# Patient Record
Sex: FEMALE | Race: WHITE | NOT HISPANIC OR LATINO | URBAN - METROPOLITAN AREA
[De-identification: names, ages, dates, MRNs, and addresses within clinical notes are randomized per-mention and may not be internally consistent; named-entity substitution may affect disease eponyms.]

---

## 2022-07-07 ENCOUNTER — HOSPITAL ENCOUNTER (OUTPATIENT)
Dept: RADIOLOGY | Facility: MEDICAL CENTER | Age: 57
End: 2022-07-07
Attending: INTERNAL MEDICINE
Payer: COMMERCIAL

## 2022-07-07 DIAGNOSIS — R07.9 CHEST PAIN, UNSPECIFIED TYPE: ICD-10-CM

## 2022-07-07 DIAGNOSIS — J06.9 ACUTE UPPER RESPIRATORY INFECTION: ICD-10-CM

## 2022-07-07 DIAGNOSIS — J06.9 VIRAL UPPER RESPIRATORY INFECTION: ICD-10-CM

## 2022-07-07 DIAGNOSIS — R06.02 SHORTNESS OF BREATH: ICD-10-CM

## 2022-07-07 PROCEDURE — 4410556 CT-CARDIAC SCORING (SELF PAY ONLY)

## 2022-07-07 PROCEDURE — 71250 CT THORAX DX C-: CPT

## 2022-10-19 ENCOUNTER — APPOINTMENT (RX ONLY)
Dept: URBAN - METROPOLITAN AREA CLINIC 6 | Facility: CLINIC | Age: 57
Setting detail: DERMATOLOGY
End: 2022-10-19

## 2022-10-19 DIAGNOSIS — L57.0 ACTINIC KERATOSIS: ICD-10-CM

## 2022-10-19 DIAGNOSIS — L57.8 OTHER SKIN CHANGES DUE TO CHRONIC EXPOSURE TO NONIONIZING RADIATION: ICD-10-CM

## 2022-10-19 DIAGNOSIS — L81.4 OTHER MELANIN HYPERPIGMENTATION: ICD-10-CM

## 2022-10-19 DIAGNOSIS — D22 MELANOCYTIC NEVI: ICD-10-CM

## 2022-10-19 DIAGNOSIS — L82.1 OTHER SEBORRHEIC KERATOSIS: ICD-10-CM

## 2022-10-19 DIAGNOSIS — D18.0 HEMANGIOMA: ICD-10-CM

## 2022-10-19 DIAGNOSIS — D485 NEOPLASM OF UNCERTAIN BEHAVIOR OF SKIN: ICD-10-CM

## 2022-10-19 DIAGNOSIS — Z71.89 OTHER SPECIFIED COUNSELING: ICD-10-CM

## 2022-10-19 PROBLEM — D48.5 NEOPLASM OF UNCERTAIN BEHAVIOR OF SKIN: Status: ACTIVE | Noted: 2022-10-19

## 2022-10-19 PROBLEM — D23.71 OTHER BENIGN NEOPLASM OF SKIN OF RIGHT LOWER LIMB, INCLUDING HIP: Status: ACTIVE | Noted: 2022-10-19

## 2022-10-19 PROBLEM — D22.5 MELANOCYTIC NEVI OF TRUNK: Status: ACTIVE | Noted: 2022-10-19

## 2022-10-19 PROBLEM — D18.01 HEMANGIOMA OF SKIN AND SUBCUTANEOUS TISSUE: Status: ACTIVE | Noted: 2022-10-19

## 2022-10-19 PROCEDURE — ? ADDITIONAL NOTES

## 2022-10-19 PROCEDURE — ? COUNSELING

## 2022-10-19 PROCEDURE — 99203 OFFICE O/P NEW LOW 30 MIN: CPT

## 2022-10-19 ASSESSMENT — LOCATION SIMPLE DESCRIPTION DERM
LOCATION SIMPLE: RIGHT UPPER ARM
LOCATION SIMPLE: LOWER BACK
LOCATION SIMPLE: RIGHT POSTERIOR UPPER ARM
LOCATION SIMPLE: LEFT UPPER ARM
LOCATION SIMPLE: RIGHT ELBOW
LOCATION SIMPLE: LEFT UPPER BACK
LOCATION SIMPLE: RIGHT FOREARM
LOCATION SIMPLE: RIGHT CHEEK
LOCATION SIMPLE: RIGHT UPPER BACK
LOCATION SIMPLE: LEFT POSTERIOR UPPER ARM
LOCATION SIMPLE: LEFT FOREARM

## 2022-10-19 ASSESSMENT — LOCATION ZONE DERM
LOCATION ZONE: TRUNK
LOCATION ZONE: FACE
LOCATION ZONE: ARM

## 2022-10-19 ASSESSMENT — LOCATION DETAILED DESCRIPTION DERM
LOCATION DETAILED: LEFT DISTAL DORSAL FOREARM
LOCATION DETAILED: LEFT INFERIOR UPPER BACK
LOCATION DETAILED: RIGHT INFERIOR CENTRAL BUCCAL CHEEK
LOCATION DETAILED: RIGHT SUPERIOR MEDIAL UPPER BACK
LOCATION DETAILED: RIGHT ELBOW
LOCATION DETAILED: RIGHT SUPERIOR UPPER BACK
LOCATION DETAILED: LEFT DISTAL MEDIAL POSTERIOR UPPER ARM
LOCATION DETAILED: SUPERIOR LUMBAR SPINE
LOCATION DETAILED: LEFT VENTRAL LATERAL DISTAL FOREARM
LOCATION DETAILED: LEFT ANTERIOR PROXIMAL UPPER ARM
LOCATION DETAILED: RIGHT VENTRAL MEDIAL DISTAL FOREARM
LOCATION DETAILED: RIGHT DISTAL RADIAL DORSAL FOREARM
LOCATION DETAILED: RIGHT DISTAL LATERAL POSTERIOR UPPER ARM
LOCATION DETAILED: RIGHT ANTERIOR PROXIMAL UPPER ARM

## 2022-10-19 ASSESSMENT — PAIN INTENSITY VAS: HOW INTENSE IS YOUR PAIN 0 BEING NO PAIN, 10 BEING THE MOST SEVERE PAIN POSSIBLE?: NO PAIN

## 2022-10-19 NOTE — PROCEDURE: ADDITIONAL NOTES
Additional Notes: Denied LN2 treatment today. Pt would like to try a more natural way at home.
Render Risk Assessment In Note?: no
Detail Level: Simple
Additional Notes: Discussed doing an excision, pt wants to hold off to deal with some health issues.

## 2022-11-11 ENCOUNTER — APPOINTMENT (OUTPATIENT)
Dept: URGENT CARE | Facility: CLINIC | Age: 57
End: 2022-11-11
Payer: COMMERCIAL

## 2022-11-12 ENCOUNTER — OFFICE VISIT (OUTPATIENT)
Dept: URGENT CARE | Facility: CLINIC | Age: 57
End: 2022-11-12
Payer: COMMERCIAL

## 2022-11-12 VITALS
SYSTOLIC BLOOD PRESSURE: 94 MMHG | OXYGEN SATURATION: 96 % | HEART RATE: 78 BPM | WEIGHT: 129 LBS | DIASTOLIC BLOOD PRESSURE: 58 MMHG | HEIGHT: 65 IN | RESPIRATION RATE: 12 BRPM | TEMPERATURE: 98.4 F | BODY MASS INDEX: 21.49 KG/M2

## 2022-11-12 DIAGNOSIS — M26.652 ARTHROPATHY OF LEFT TEMPOROMANDIBULAR JOINT: ICD-10-CM

## 2022-11-12 PROBLEM — E55.9 VITAMIN D DEFICIENCY: Status: ACTIVE | Noted: 2021-05-10

## 2022-11-12 PROBLEM — D75.89 MACROCYTOSIS WITHOUT ANEMIA: Status: ACTIVE | Noted: 2019-12-22

## 2022-11-12 PROBLEM — D24.9 FIBROADENOMA OF BREAST: Status: ACTIVE | Noted: 2017-11-02

## 2022-11-12 PROBLEM — N95.1 MENOPAUSAL SYMPTOMS: Status: ACTIVE | Noted: 2017-11-02

## 2022-11-12 PROCEDURE — 99213 OFFICE O/P EST LOW 20 MIN: CPT | Performed by: PHYSICIAN ASSISTANT

## 2022-11-12 RX ORDER — CHLORAL HYDRATE 500 MG
CAPSULE ORAL
COMMUNITY

## 2022-11-12 RX ORDER — MILK THISTLE 100 %
POWDER (GRAM) MISCELLANEOUS
COMMUNITY

## 2022-11-12 RX ORDER — LEVOTHYROXINE SODIUM 50 UG/1
25 TABLET ORAL
COMMUNITY
Start: 2022-09-28 | End: 2023-03-20 | Stop reason: SDUPTHER

## 2022-11-12 ASSESSMENT — ENCOUNTER SYMPTOMS
DIZZINESS: 0
EYE PAIN: 0
CHILLS: 0
DIAPHORESIS: 0
ABDOMINAL PAIN: 0
EYE DISCHARGE: 0
WHEEZING: 0
SINUS PAIN: 0
CONSTIPATION: 0
FEVER: 0
DIARRHEA: 0
SORE THROAT: 0
VOMITING: 0
SHORTNESS OF BREATH: 0
COUGH: 0
EYE REDNESS: 0
NAUSEA: 0
HEADACHES: 0

## 2022-11-12 NOTE — PROGRESS NOTES
"  Subjective:     Paola Everett  is a 56 y.o. female who presents for Otalgia (/jaw pain, LEFT side, x10 days  notes history of TMJ, )       She presents today with left-sided TMJ pain, TMJ mechanical symptoms, and otalgia x10 days.  Does have a longstanding history of TMJ and does use a at night bite block for this.  She has seen her chiropractor for the symptoms, he has performed adjustments but did want the patient to follow-up to rule out an infection.  She has some discomfort with chewing but denies jaw claudication.  No temporal pain or temporal tenderness.  No changes in vision.  No jaw malocclusion or dental pain.  No trauma or injury to the jaw.  Has been taking over-the-counter medications for symptoms.     Review of Systems   Constitutional:  Negative for chills, diaphoresis, fever and malaise/fatigue.   HENT:  Positive for ear pain. Negative for congestion, ear discharge, hearing loss, sinus pain and sore throat.         Left-sided TMJ pain   Eyes:  Negative for pain, discharge and redness.   Respiratory:  Negative for cough, shortness of breath and wheezing.    Cardiovascular:  Negative for chest pain.   Gastrointestinal:  Negative for abdominal pain, constipation, diarrhea, nausea and vomiting.   Neurological:  Negative for dizziness and headaches.    Allergies   Allergen Reactions    Gluten Meal Vomiting    Sulfa Drugs Hives    Codeine Itching     Itching      Sulfamethoxazole-Trimethoprim Rash    Oxycodone-Acetaminophen Itching     History reviewed. No pertinent past medical history.     Objective:   BP (!) 94/58   Pulse 78   Temp 36.9 °C (98.4 °F) (Temporal)   Resp 12   Ht 1.651 m (5' 5\")   Wt 58.5 kg (129 lb)   SpO2 96%   BMI 21.47 kg/m²   Physical Exam  Vitals and nursing note reviewed.   Constitutional:       General: She is not in acute distress.     Appearance: She is not ill-appearing or toxic-appearing.   HENT:      Head: Normocephalic.      Comments: No tenderness to palpation over " the left-sided temporal region or over the temporal arteries.     Right Ear: Tympanic membrane, ear canal and external ear normal. There is no impacted cerumen.      Left Ear: Tympanic membrane, ear canal and external ear normal. There is no impacted cerumen.      Nose: No congestion or rhinorrhea.      Mouth/Throat:      Mouth: Mucous membranes are moist.      Pharynx: No oropharyngeal exudate or posterior oropharyngeal erythema.      Comments: Tenderness to palpation over the left-sided TMJ region, symptoms are worsened with biting down.  No clicking or grinding noted on exam.  There is tenderness to palpation over the masseter muscle belly and the temporalis muscle belly, palpating these muscle bellies does somewhat reproduce her jaw symptoms.  Eyes:      General: No scleral icterus.     Conjunctiva/sclera: Conjunctivae normal.   Pulmonary:      Effort: Pulmonary effort is normal. No respiratory distress.      Breath sounds: No stridor.   Musculoskeletal:      Cervical back: Neck supple.   Neurological:      Mental Status: She is alert and oriented to person, place, and time.   Psychiatric:         Mood and Affect: Mood normal.         Behavior: Behavior normal.         Thought Content: Thought content normal.         Judgment: Judgment normal.           Diagnostic testing: None    Assessment/Plan:     Encounter Diagnoses   Name Primary?    Arthropathy of left temporomandibular joint           Plan for care for today's complaint includes continuation of over-the-counter anti-inflammatory medications for symptoms.  Continue to follow with chiropractic care for ongoing evaluation and management of the TMJ.  Did discuss with the patient that there is no evidence of ear infection on exam today.  She is tender to palpation over the masseter and temporalis muscle bellies, this tenderness does reproduce her TMJ symptoms, so I did instruct her on self myofascial release techniques of these muscles.  She should trial  these muscle release techniques 3-4 times per week.  If symptoms or not improved with medications and myofascial release techniques then referral would be warranted to ENT for further evaluation management.    See AVS Instructions below for written guidance provided to patient on after-visit management and care in addition to our verbal discussion during the visit.    Please note that this dictation was created using voice recognition software. I have attempted to correct all errors, but there may be sound-alike, spelling, grammar and possibly content errors that I did not discover before finalizing the note.    Danteimelda Simpson PA-C

## 2023-02-27 ENCOUNTER — APPOINTMENT (RX ONLY)
Dept: URBAN - METROPOLITAN AREA CLINIC 4 | Facility: CLINIC | Age: 58
Setting detail: DERMATOLOGY
End: 2023-02-27

## 2023-02-27 DIAGNOSIS — L21.8 OTHER SEBORRHEIC DERMATITIS: ICD-10-CM

## 2023-02-27 DIAGNOSIS — F42.4 EXCORIATION (SKIN-PICKING) DISORDER: ICD-10-CM

## 2023-02-27 DIAGNOSIS — D18.0 HEMANGIOMA: ICD-10-CM

## 2023-02-27 DIAGNOSIS — D22 MELANOCYTIC NEVI: ICD-10-CM

## 2023-02-27 DIAGNOSIS — Z71.89 OTHER SPECIFIED COUNSELING: ICD-10-CM

## 2023-02-27 DIAGNOSIS — L72.8 OTHER FOLLICULAR CYSTS OF THE SKIN AND SUBCUTANEOUS TISSUE: ICD-10-CM

## 2023-02-27 DIAGNOSIS — L81.4 OTHER MELANIN HYPERPIGMENTATION: ICD-10-CM

## 2023-02-27 DIAGNOSIS — L82.1 OTHER SEBORRHEIC KERATOSIS: ICD-10-CM

## 2023-02-27 DIAGNOSIS — L85.3 XEROSIS CUTIS: ICD-10-CM

## 2023-02-27 DIAGNOSIS — L57.8 OTHER SKIN CHANGES DUE TO CHRONIC EXPOSURE TO NONIONIZING RADIATION: ICD-10-CM

## 2023-02-27 PROBLEM — D22.5 MELANOCYTIC NEVI OF TRUNK: Status: ACTIVE | Noted: 2023-02-27

## 2023-02-27 PROBLEM — D48.5 NEOPLASM OF UNCERTAIN BEHAVIOR OF SKIN: Status: ACTIVE | Noted: 2023-02-27

## 2023-02-27 PROBLEM — L30.9 DERMATITIS, UNSPECIFIED: Status: ACTIVE | Noted: 2023-02-27

## 2023-02-27 PROBLEM — D18.01 HEMANGIOMA OF SKIN AND SUBCUTANEOUS TISSUE: Status: ACTIVE | Noted: 2023-02-27

## 2023-02-27 PROBLEM — D23.72 OTHER BENIGN NEOPLASM OF SKIN OF LEFT LOWER LIMB, INCLUDING HIP: Status: ACTIVE | Noted: 2023-02-27

## 2023-02-27 PROCEDURE — ? OBSERVATION

## 2023-02-27 PROCEDURE — ? ADDITIONAL NOTES

## 2023-02-27 PROCEDURE — ? COUNSELING

## 2023-02-27 PROCEDURE — 99213 OFFICE O/P EST LOW 20 MIN: CPT

## 2023-02-27 ASSESSMENT — LOCATION DETAILED DESCRIPTION DERM
LOCATION DETAILED: LEFT MEDIAL SUPERIOR CHEST
LOCATION DETAILED: UPPER STERNUM
LOCATION DETAILED: RIGHT INFERIOR UPPER BACK
LOCATION DETAILED: RIGHT MID-UPPER BACK
LOCATION DETAILED: MIDDLE STERNUM
LOCATION DETAILED: RIGHT SUPERIOR MEDIAL UPPER BACK
LOCATION DETAILED: RIGHT CENTRAL BUCCAL CHEEK
LOCATION DETAILED: LEFT CHIN
LOCATION DETAILED: LEFT SUPERIOR UPPER BACK
LOCATION DETAILED: RIGHT SUPERIOR UPPER BACK
LOCATION DETAILED: RIGHT MEDIAL FRONTAL SCALP

## 2023-02-27 ASSESSMENT — LOCATION SIMPLE DESCRIPTION DERM
LOCATION SIMPLE: RIGHT UPPER BACK
LOCATION SIMPLE: CHEST
LOCATION SIMPLE: CHIN
LOCATION SIMPLE: LEFT UPPER BACK
LOCATION SIMPLE: RIGHT SCALP
LOCATION SIMPLE: RIGHT CHEEK

## 2023-02-27 ASSESSMENT — LOCATION ZONE DERM
LOCATION ZONE: TRUNK
LOCATION ZONE: FACE
LOCATION ZONE: SCALP

## 2023-02-27 NOTE — PROCEDURE: ADDITIONAL NOTES
Additional Notes: Discussed different options of treatment, but patient declines prescribed medication. Recommended for patient to wash hair with OTC shampoo for dandruff that she likes to help tolerate yeast on scalp.
Detail Level: Detailed
Render Risk Assessment In Note?: no

## 2023-02-27 NOTE — PROCEDURE: COUNSELING
Detail Level: Zone
Detail Level: Generalized
Sunscreen Recommendations: I recommend a zinc or titanium based sunscreen for daily wear.  I recommend 30 spf  or greater.
Moisturizer Recommendations: Cerave Cream
Detail Level: Detailed
Detail Level: Simple
Shampoo Recommendations: Over the counter head and shoulder or selsun blue

## 2023-03-20 ENCOUNTER — OFFICE VISIT (OUTPATIENT)
Dept: MEDICAL GROUP | Facility: LAB | Age: 58
End: 2023-03-20
Payer: COMMERCIAL

## 2023-03-20 VITALS
HEIGHT: 65 IN | TEMPERATURE: 98.7 F | BODY MASS INDEX: 20.96 KG/M2 | OXYGEN SATURATION: 95 % | DIASTOLIC BLOOD PRESSURE: 56 MMHG | RESPIRATION RATE: 14 BRPM | WEIGHT: 125.8 LBS | SYSTOLIC BLOOD PRESSURE: 108 MMHG | HEART RATE: 78 BPM

## 2023-03-20 DIAGNOSIS — E55.9 VITAMIN D DEFICIENCY: ICD-10-CM

## 2023-03-20 DIAGNOSIS — Z78.9 FAMILY HISTORY UNKNOWN: ICD-10-CM

## 2023-03-20 DIAGNOSIS — R10.2 PELVIC PAIN IN FEMALE: ICD-10-CM

## 2023-03-20 DIAGNOSIS — Z13.6 SCREENING FOR CARDIOVASCULAR CONDITION: ICD-10-CM

## 2023-03-20 DIAGNOSIS — Z12.11 SCREENING FOR COLORECTAL CANCER: ICD-10-CM

## 2023-03-20 DIAGNOSIS — Z87.42 HISTORY OF OVARIAN CYST: ICD-10-CM

## 2023-03-20 DIAGNOSIS — D25.1 INTRAMURAL LEIOMYOMA OF UTERUS: ICD-10-CM

## 2023-03-20 DIAGNOSIS — Z12.12 SCREENING FOR COLORECTAL CANCER: ICD-10-CM

## 2023-03-20 DIAGNOSIS — E03.9 ACQUIRED HYPOTHYROIDISM: ICD-10-CM

## 2023-03-20 PROBLEM — N95.1 MENOPAUSAL SYMPTOMS: Status: RESOLVED | Noted: 2017-11-02 | Resolved: 2023-03-20

## 2023-03-20 PROBLEM — D25.9 FIBROID, UTERINE: Status: ACTIVE | Noted: 2023-03-20

## 2023-03-20 PROCEDURE — 99214 OFFICE O/P EST MOD 30 MIN: CPT | Performed by: NURSE PRACTITIONER

## 2023-03-20 RX ORDER — LEVOTHYROXINE SODIUM 50 UG/1
50 TABLET ORAL
Qty: 90 TABLET | Refills: 3 | Status: SHIPPED | OUTPATIENT
Start: 2023-03-20

## 2023-03-20 ASSESSMENT — PATIENT HEALTH QUESTIONNAIRE - PHQ9: CLINICAL INTERPRETATION OF PHQ2 SCORE: 0

## 2023-03-20 NOTE — ASSESSMENT & PLAN NOTE
Patient reports that she has had ovarian cysts in the past and feels like she is having similar symptoms for the last several months. LMP was over a year ago. Unsure if she is spotting as she also has a bleeding hemorrhoid. Reports pelvic bloating and low back pain. Denies dysuria, hematuria, abnormal vaginal discharge, frequency, urgency.

## 2023-03-20 NOTE — PROGRESS NOTES
"Subjective:     CC:    Chief Complaint   Patient presents with    Referral Needed     gynecology     HISTORY OF THE PRESENT ILLNESS: Patient is a 57 y.o. female. This pleasant patient is here today to establish care and discuss the following:    History of ovarian cyst  Patient reports that she has had ovarian cysts in the past and feels like she is having similar symptoms for the last several months. LMP was over a year ago. Unsure if she is spotting as she also has a bleeding hemorrhoid. Reports pelvic bloating and low back pain. Denies dysuria, hematuria, abnormal vaginal discharge, frequency, urgency.     Fibroid, uterine  Per chart, US 2021: Small uterine fundal intramural fibroids measuring up to 2 cm.     Hypothyroidism  Labwork due. Taking medicine as directed. Denies palpitations, skin changes, temperature intolerance, changes in bowel habits.    ROS:   As documented in history of present illness above      Objective:     Exam: /56 (BP Location: Right arm, Patient Position: Sitting, BP Cuff Size: Adult)   Pulse 78   Temp 37.1 °C (98.7 °F)   Resp 14   Ht 1.651 m (5' 5\")   Wt 57.1 kg (125 lb 12.8 oz)   SpO2 95%  Body mass index is 20.93 kg/m².    Constitutional: Alert, no distress, well-groomed.  Skin: Warm, dry, good turgor, no rashes in visible areas.  Eye: Equal, round and reactive, conjunctiva clear, lids normal.  ENMT: Lips without lesions, good dentition, moist mucous membranes.  Neck: Trachea midline, no masses, no thyromegaly.  Respiratory: Unlabored respiratory effort, no cough.  MSK: Normal gait, moves all extremities.  Neuro: Grossly non-focal.   Psych: Alert and oriented x3, normal affect and mood.    Assessment & Plan:   57 y.o. female with the following -    1. History of ovarian cyst  US ordered. Labs as indicated. Referral placed to gynecology.   - US-PELVIC COMPLETE (TRANSABDOMINAL/TRANSVAGINAL) (COMBO); Future  - Referral to Gynecology  - ESTRADIOL; Future  - PROGESTERONE; " Future  - TESTOSTERONE F&T FEMALES/CHILD; Future    2. Pelvic pain in female  US ordered. Labs as indicated. Referral placed to gynecology.   - US-PELVIC COMPLETE (TRANSABDOMINAL/TRANSVAGINAL) (COMBO); Future  - Referral to Gynecology  - H.PYLORI STOOL ANTIGEN; Future    3. Intramural leiomyoma of uterus  US ordered. Referral placed to gynecology.   - US-PELVIC COMPLETE (TRANSABDOMINAL/TRANSVAGINAL) (COMBO); Future  - Referral to Gynecology    4. Acquired hypothyroidism  Chronic, stable condition. Continue thyroid medication.  Instructed patient to take on empty stomach with glass of water, 30 minutes prior to food or other medications.  Labs as indicated.  - LEVOXYL 50 MCG Tab; Take 1 Tablet by mouth every morning on an empty stomach.  Dispense: 90 Tablet; Refill: 3  - CBC WITH DIFFERENTIAL; Future  - FREE THYROXINE; Future  - TSH; Future  - Comp Metabolic Panel; Future  - THYROID PEROXIDASE  (TPO) AB; Future  - T3 FREE; Future  - VITAMIN D,25 HYDROXY (DEFICIENCY); Future  - Lipid Profile; Future  - HEMOGLOBIN A1C; Future    5. Vitamin D deficiency  Labs ordered.   - VITAMIN D,25 HYDROXY (DEFICIENCY); Future    6. Screening for cardiovascular condition  Labs ordered.   - Lipid Profile; Future  - HEMOGLOBIN A1C; Future    7. Family history unknown  Referral placed to [facility] [place] project.   - Referral to Genetic Research Studies    8. Screening for colorectal cancer  Cologuard ordered.   - COLOGUARD (FIT DNA)

## 2023-05-09 ENCOUNTER — HOSPITAL ENCOUNTER (OUTPATIENT)
Dept: RADIOLOGY | Facility: MEDICAL CENTER | Age: 58
End: 2023-05-09
Attending: NURSE PRACTITIONER
Payer: COMMERCIAL

## 2023-05-09 DIAGNOSIS — D25.1 INTRAMURAL LEIOMYOMA OF UTERUS: ICD-10-CM

## 2023-05-09 DIAGNOSIS — Z87.42 HISTORY OF OVARIAN CYST: ICD-10-CM

## 2023-05-09 DIAGNOSIS — R10.2 PELVIC PAIN IN FEMALE: ICD-10-CM

## 2023-05-09 PROCEDURE — 76830 TRANSVAGINAL US NON-OB: CPT

## 2023-05-15 ENCOUNTER — TELEPHONE (OUTPATIENT)
Dept: MEDICAL GROUP | Facility: LAB | Age: 58
End: 2023-05-15
Payer: COMMERCIAL